# Patient Record
Sex: FEMALE | URBAN - METROPOLITAN AREA
[De-identification: names, ages, dates, MRNs, and addresses within clinical notes are randomized per-mention and may not be internally consistent; named-entity substitution may affect disease eponyms.]

---

## 2021-04-21 PROBLEM — I10 UNCONTROLLED HYPERTENSION: Status: ACTIVE | Noted: 2021-04-21

## 2022-01-28 ENCOUNTER — OFFICE VISIT (OUTPATIENT)
Dept: URGENT CARE | Age: 57
End: 2022-01-28
Payer: COMMERCIAL

## 2022-01-28 VITALS
SYSTOLIC BLOOD PRESSURE: 147 MMHG | HEIGHT: 64 IN | WEIGHT: 165 LBS | BODY MASS INDEX: 28.17 KG/M2 | OXYGEN SATURATION: 97 % | DIASTOLIC BLOOD PRESSURE: 90 MMHG | HEART RATE: 68 BPM | TEMPERATURE: 97.3 F

## 2022-01-28 DIAGNOSIS — Z11.52 ENCOUNTER FOR SCREENING FOR COVID-19: Primary | ICD-10-CM

## 2022-01-28 PROCEDURE — 87426 SARSCOV CORONAVIRUS AG IA: CPT | Performed by: NURSE PRACTITIONER

## 2022-01-28 PROCEDURE — 99212 OFFICE O/P EST SF 10 MIN: CPT | Performed by: NURSE PRACTITIONER

## 2022-01-28 NOTE — LETTER
NOTIFICATION RETURN TO WORK / SCHOOL    2022    Ms. Yan Ray  [address]  [place] [facility] [id number]  : 1965. To Whom It May Concern:    Yan Ray was tested for COVID-19 on 2022 for a [employer] Covid Antigen     Test , and the result was NEGATIVE. Patient stated she did NOT have history of     COVID-19 in the past      If there are questions or concerns, please have the patient contact our office. Sincerely,      SHADY Mcdonald CNP      If there are questions or concerns, please have the patient contact our office.         Sincerely,      SHADY Mcdonald CNP

## 2022-01-28 NOTE — LETTER
NOTIFICATION RETURN TO WORK / SCHOOL    1/28/2022    Ms. Anjana Villa  [address]  [facility] [id number]      To Whom It May Concern:    Anjana Villa was tested for COVID-19 on 1/28/2022 for a Rapid Test , and the result     was NEGATIVE. Patient stated she did NOT have history of COVID-19 in the past      If there are questions or concerns, please have the patient contact our office.         Sincerely,      Elodia Persaud, SHADY - CNP

## 2022-01-28 NOTE — PROGRESS NOTES
Michael Madrid (:  1965) is a 64 y.o. female,Established patient, here for evaluation of the following chief complaint(s):  Covid Testing (For travel)         ASSESSMENT/PLAN:  1. Encounter for screening for COVID-19  -     POCT COVID-19, Antigen      Return if symptoms worsen or fail to improve, for FOLLOW UP WITH PCP. Subjective   SUBJECTIVE/OBJECTIVE:  .vitals  Vitals:    22 1757   BP: (!) 147/90   Pulse: 68   Temp: 97.3 °F (36.3 °C)   SpO2: 97%       No results found for: COVID19, FLUAPOC, FLUBPOC, STREPAAG    HPI  Patient here to get Covid testing for travel. Reports had a PCR COVID TEST on 22 was negative but it was too early for travel. Denies any history of Covid or any symptoms today. Today tested for a rapid Covid and it is negative. Review of Systems   Constitutional: Negative for appetite change, chills, fatigue, fever and unexpected weight change. HENT: Negative for congestion, ear pain, facial swelling, sinus pressure and sinus pain. Respiratory: Negative for cough, chest tightness, shortness of breath and wheezing. Cardiovascular: Negative for chest pain, palpitations and leg swelling. Gastrointestinal: Negative for abdominal distention, abdominal pain, constipation, diarrhea, nausea and vomiting. Genitourinary: Negative for decreased urine volume, difficulty urinating, dysuria, flank pain, frequency and urgency. Musculoskeletal: Negative for back pain, joint swelling, neck pain and neck stiffness. Skin: Negative for color change and rash. Allergic/Immunologic: Negative for environmental allergies and food allergies. Neurological: Negative for dizziness, syncope, weakness and headaches. Psychiatric/Behavioral: Negative for agitation and confusion. Objective     Vitals:    22 1757   BP: (!) 147/90   Pulse: 68   Temp: 97.3 °F (36.3 °C)   SpO2: 97%       Physical Exam  Vitals reviewed.    Constitutional:       Appearance: Normal appearance. HENT:      Head: Normocephalic. Right Ear: Ear canal normal.      Left Ear: Ear canal normal.      Nose: No congestion or rhinorrhea. Mouth/Throat:      Mouth: Mucous membranes are dry. Pharynx: Oropharynx is clear. Eyes:      Pupils: Pupils are equal, round, and reactive to light. Cardiovascular:      Rate and Rhythm: Normal rate and regular rhythm. Pulmonary:      Effort: Pulmonary effort is normal. No respiratory distress. Breath sounds: Normal breath sounds. No stridor. No wheezing, rhonchi or rales. Chest:      Chest wall: No tenderness. Abdominal:      General: Bowel sounds are normal. There is no distension. Palpations: Abdomen is soft. Tenderness: There is no abdominal tenderness. Musculoskeletal:         General: No swelling or tenderness. Right lower leg: No edema. Left lower leg: No edema. Skin:     General: Skin is warm and dry. Coloration: Skin is not jaundiced or pale. Findings: No lesion or rash. Neurological:      Mental Status: She is alert and oriented to person, place, and time. Mental status is at baseline. Motor: No weakness. Psychiatric:         Behavior: Behavior normal.              An electronic signature was used to authenticate this note.     --SHADY Mohr - CNP

## 2022-01-29 ASSESSMENT — ENCOUNTER SYMPTOMS
WHEEZING: 0
NAUSEA: 0
CHEST TIGHTNESS: 0
SINUS PAIN: 0
CONSTIPATION: 0
DIARRHEA: 0
ABDOMINAL DISTENTION: 0
SINUS PRESSURE: 0
BACK PAIN: 0
COLOR CHANGE: 0
ABDOMINAL PAIN: 0
SHORTNESS OF BREATH: 0
FACIAL SWELLING: 0
COUGH: 0
VOMITING: 0